# Patient Record
(demographics unavailable — no encounter records)

---

## 2024-12-18 NOTE — DATA REVIEWED
[de-identified] : X-rays of the Right Knee (3 views) were obtained at [facility] on 12/14/24 and reviewed today: Chronic appearing linear mineralization along the anterior margin of the patella, which may be related to pathology of the quadriceps prepatellar continuation. Soft tissue swelling about the anterior aspect of the knee. Recommend further evaluation with right knee MRI. No acute displaced fracture or dislocation within the right knee. Small right knee joint effusion.

## 2024-12-18 NOTE — ASSESSMENT
[FreeTextEntry1] : Marquez is a 10 year old male with a contusion of the right knee sustained 12/14/24.   Today's assessment was performed with the assistance of the patient's parent as an independent historian given the patient's age, who could not be considered a reliable history/due to the nonverbal nature given the patient's young age. Clinical findings and all available x-ray results were reviewed at length with the patient and parent. The condition, natural history, and prognosis were explained to the patient and family. Clinically, he has residual bruising of the area with improved discomfort and full range of motion. Marquez likely sustained a contusion to the right knee. The recommendation at this time is to continue activities as tolerated, no restrictions. School note provided at today's office visit. He may continue WBAT on the RLE. He will follow up in the office on an as needed basis for this injury at this time or return if any new concerns should arise.  All questions and concerns were addressed today. Parent and patient verbalize understanding and agree with plan of care.  IFanny PA-C, have acted as a scribe and documented the above information for Dr. Infante.

## 2024-12-18 NOTE — END OF VISIT
[FreeTextEntry3] : A physician assistant/resident assisted with documenting the visit and acted as a scribe. I have seen and examined the patient, made my assessment and plan and have made all modifications necessary to the note.  Janny Infante MD Pediatric Orthopaedics Surgery [facility]

## 2024-12-18 NOTE — HISTORY OF PRESENT ILLNESS
[FreeTextEntry1] : Marquez is a 10 year old, otherwise healthy, male presenting to the office today with his mother for initial pediatric orthopedic evaluation of a right knee injury. Patient was playing in his hockey game on 12/14/24, now 4 days ago, when he was checked by another player causing him to fall onto the ice landing directly on his knee. He then slid across the ice and hit his right knee into the boards of the rink. Patient was seen at [facility] where x-rays of the right knee were obtained revealing no acute displaced fracture or dislocation within the right knee and a small right knee joint effusion. He was recommended to ice and elevate the right knee and follow up with pediatric orthopedics. Today, he reports that he is overall doing well with improvement in his pain and discomfort about the right knee. He reports residual bruising of the area. He is no longer requiring pain medications. He has been weight bearing as tolerated on the RLE without difficulty or limp. Patient returned back to hockey yesterday and was able to participate without restrictions. Denies numbness or tingling. Here for further orthopedic evaluation.

## 2024-12-18 NOTE — REVIEW OF SYSTEMS
[Joint Pains] : arthralgias [Joint Swelling] : joint swelling  [Appropriate Age Development] : development appropriate for age [Change in Activity] : no change in activity [Fever Above 102] : no fever [Rash] : no rash [Itching] : no itching [Eye Pain] : no eye pain [Redness] : no redness [Nasal Stuffiness] : no nasal congestion [Sore Throat] : no sore throat [Heart Problems] : no heart problems [Murmur] : no murmur [Tachypnea] : no tachypnea [Wheezing] : no wheezing [Vomiting] : no vomiting [Limping] : no limping

## 2024-12-18 NOTE — PHYSICAL EXAM
[FreeTextEntry1] : Gait: Presents ambulating independently without signs of antalgia. Good coordination and balance noted. GENERAL: alert, cooperative, in NAD SKIN: The skin is intact, warm, pink and dry over the area examined. EYES: Normal conjunctiva, normal eyelids and pupils were equal and round. ENT: normal ears, normal nose and normal lips. CARDIOVASCULAR: brisk capillary refill, but no peripheral edema. RESPIRATORY: The patient is in no apparent respiratory distress. They're taking full deep breaths without use of accessory muscles or evidence of audible wheezes or stridor without the use of a stethoscope. Normal respiratory effort. ABDOMEN: not examined  Right Knee: (+) ecchymosis noted globally about the right knee. No visible effusion, muscle atrophy or asymmetry. Minimal tenderness to palpation about the anterior aspect of knee.  No joint line, MCL, LCL, patellar tendon, or quadriceps tendon tenderness. Full active and passive range of motion of the knee. Toes are warm, pink, and moving freely. Strength is 5/5. Sensation is intact to light touch distally. Patellar reflex +2 B/L. Brisk capillary refill in all toes. No joint laxity palpable. Joint is stable with varus and valgus stress. Negative Lachmann test, negative anterior and posterior drawer with solid end point. Negative [facility] test. Negative patellar grind and patellar apprehension test. No abnormal findings on ankle or hip examination.